# Patient Record
Sex: FEMALE | Race: WHITE | HISPANIC OR LATINO | Employment: UNEMPLOYED | URBAN - METROPOLITAN AREA
[De-identification: names, ages, dates, MRNs, and addresses within clinical notes are randomized per-mention and may not be internally consistent; named-entity substitution may affect disease eponyms.]

---

## 2017-01-17 ENCOUNTER — HOSPITAL ENCOUNTER (OUTPATIENT)
Dept: RADIOLOGY | Facility: MEDICAL CENTER | Age: 42
End: 2017-01-17
Attending: OBSTETRICS & GYNECOLOGY
Payer: COMMERCIAL

## 2017-01-17 DIAGNOSIS — Z12.31 ENCOUNTER FOR SCREENING MAMMOGRAM FOR MALIGNANT NEOPLASM OF BREAST: ICD-10-CM

## 2017-01-17 PROCEDURE — G0202 SCR MAMMO BI INCL CAD: HCPCS

## 2017-09-26 ENCOUNTER — HOSPITAL ENCOUNTER (OUTPATIENT)
Dept: RADIOLOGY | Facility: MEDICAL CENTER | Age: 42
End: 2017-09-26
Attending: FAMILY MEDICINE
Payer: COMMERCIAL

## 2017-09-26 DIAGNOSIS — R76.11 PPD POSITIVE: ICD-10-CM

## 2017-09-26 PROCEDURE — 71010 DX-CHEST-LIMITED (1 VIEW): CPT

## 2017-10-19 ENCOUNTER — SLEEP CENTER VISIT (OUTPATIENT)
Dept: SLEEP MEDICINE | Facility: MEDICAL CENTER | Age: 42
End: 2017-10-19
Payer: COMMERCIAL

## 2017-10-19 VITALS
TEMPERATURE: 98.7 F | SYSTOLIC BLOOD PRESSURE: 120 MMHG | DIASTOLIC BLOOD PRESSURE: 60 MMHG | HEIGHT: 62 IN | WEIGHT: 141 LBS | BODY MASS INDEX: 25.95 KG/M2 | RESPIRATION RATE: 16 BRPM | OXYGEN SATURATION: 96 % | HEART RATE: 73 BPM

## 2017-10-19 DIAGNOSIS — G47.33 OBSTRUCTIVE SLEEP APNEA: ICD-10-CM

## 2017-10-19 PROCEDURE — 99244 OFF/OP CNSLTJ NEW/EST MOD 40: CPT | Performed by: INTERNAL MEDICINE

## 2017-10-19 RX ORDER — LEVOTHYROXINE SODIUM 75 UG/1
75 CAPSULE ORAL DAILY
COMMUNITY

## 2017-10-19 RX ORDER — ZOLPIDEM TARTRATE 5 MG/1
TABLET ORAL
Qty: 3 TAB | Refills: 0 | Status: SHIPPED | OUTPATIENT
Start: 2017-10-19 | End: 2019-04-27

## 2017-10-19 NOTE — PROGRESS NOTES
CC: Sleep apnea evaluation    HPI:    42-year-old female kindly referred by  for evaluation of probable obstructive sleep apnea syndrome. The patient had an 2017 overnight oximetry which showed that she had clustered and saw-toothed desaturations compatible sleep apnea. Her saturations were less than or equal to 89% for 22.8 minutes and her low saturation was 73%.    Has Hashimoto's so unclear to her how much of her sx are 2/2 to that versus ROXANNE.  has noticed that her snoring is getting worse, especially the last 2 years.  Mother might have ROXANNE but hasn't had the evaluation yet, is a snorer.    Her Symptoms:  Snoring: +  Very loud snoring: +  Witnessed apneas: +  Resuscitative snorts: once  Nocturnal shortness of breath: + rarely  Non-restorative sleep: +  Insomnia: -  Nocturnal awakenings: sometimes  Nocturia: -  EDS: + variable  Fatigue: +  Tiredness: +  Falls asleep accidentally: _  Napping or returning to bed after arising: -  Restless legs: -  Limb movements during sleep: -  Nocturnal headaches: -      There are no active problems to display for this patient.      Past Medical History:   Diagnosis Date   • Chickenpox    • Hypothyroidism         Past Surgical History:   Procedure Laterality Date   • REPEAT C SECTION  2011    Performed by ALENA HAWLEY at LABOR AND DELIVERY   • UMBILICAL HERNIA REPAIR  10/24/08    Performed by MICHELLE NIEVES at SURGERY SAME DAY [facility] ORS   • PB ENLARGE BREAST WITH IMPLANT     • PB  DELIVERY ONLY  207       Family History   Problem Relation Age of Onset   • Sleep Apnea Neg Hx        Social History     Social History   • Marital status:      Spouse name: N/A   • Number of children: N/A   • Years of education: N/A     Occupational History   • Not on file.     Social History Main Topics   • Smoking status: Passive Smoke Exposure - Never Smoker   • Smokeless tobacco: Never Used   • Alcohol use No      Comment: occasionally,  "not while pregnant   • Drug use: No   • Sexual activity: Not on file     Other Topics Concern   • Not on file     Social History Narrative   • No narrative on file       Current Outpatient Prescriptions   Medication Sig Dispense Refill   • zolpidem (AMBIEN) 5 MG Tab Take 1-2 tablets by mouth every evening as needed for insomnia. Bring to sleep study. 3 Tab 0   • Levothyroxine Sodium 75 MCG Cap Take  by mouth.       No current facility-administered medications for this visit.     \"CURRENT RX\"    ALLERGIES: Aspirin and Ibuprofen    ROS    Constitutional: Denies fever, chills, sweats, fatigue, weight loss.   Eyes: Denies glasses, vision loss, pain, drainage, double vision.   Ears/Nose/Mouth/Throat: Denies earache, difficulty hearing, ringing/buzzing in ears, rhinitis/nasal congestion, injury, recurrent sore throat, persistent hoarseness, decayed teeth/toothaches.   Cardiovascular: Denies chest pain, tightness, palpitations, swelling in legs/feet, fainting, difficulty breathing when lying down but gets better when sitting up.   Respiratory: Denies shortness of breath, cough, sputum, wheezing, painful breathing, coughing up blood.   Sleep: Per history of present illness  Gastrointestinal: Denies heartburn, difficulty swallowing, nausea, abdominal pain, diarrhea, constipation.   Genitourinary: Denies frequent urination, painful urination, blood in urine, discharge.   Musculoskeletal: Denies painful joints, sore muscles, back pain.   Integumentary: Denies rashes, lumps, color changes.   Neurological: Denies frequent headaches, dizziness, weakness    PHYSICAL EXAM    /60   Pulse 73   Temp 37.1 °C (98.7 °F)   Resp 16   Ht 1.575 m (5' 2\")   Wt 64 kg (141 lb)   SpO2 96%   BMI 25.79 kg/m²   Appearance: Well-nourished, well-developed, no acute distress  Eyes:  PERRLA, EOMI  Hearing:  Grossly intact  Nose:  Normal, no lesions or deformities, turbinates moist  Oropharynx:  Tongue normal, posterior pharynx without " erythema or exudate  Mallampati classification:  1  Neck: Supple, trachea midline, no masses  Respiratory effort:  No intercostal retractions or use of accessory muscles  Lung auscultation:  No wheezes rhonchi rubs or rales  Cardiac auscultation:  No murmurs, rubs, or gallops, no regular rhythm, normal rate  Abdomen:  No tenderness, no organomegaly  Extremities:  No cyanosis, clubbing, edema  Gait and Station:  Normal  Digits and nails: No clubbing, cyanosis, petechiae, or nodes  Musculoskeletal:  Grossly normal  Skin:  No rashes  Orientation:  Oriented time, place, and person  Mood and affect:  No depression, anxiety, agitation  Judgment:  Intact    PROBLEMS:    1. Obstructive sleep apnea  - zolpidem (AMBIEN) 5 MG Tab; Take 1-2 tablets by mouth every evening as needed for insomnia. Bring to sleep study.  Dispense: 3 Tab; Refill: 0  - POLYSOMNOGRAPHY, 4 OR MORE; Future      PLAN:   The patient has signs and symptoms consistent with obstructive sleep apnea hypopnea syndrome. Will schedule her to have a nocturnal polysomnogram using zolpidem to assist with sleep onset and maintenance should the need arise. Will return after the results are available to determine further diagnostic needs and/or treatment options.   The risks of untreated sleep apnea were discussed with the patient at length. Patients with ROXANNE are at increased risk of cardiovascular disease including coronary artery disease, systemic arterial hypertension, pulmonary arterial hypertension, cardiac arrythmias, and stroke. ROXANNE patients have an increased risk of motor vehicle accidents, type 2 diabetes, chronic kidney disease, and non-alcoholic liver disease. The patient was advised to avoid driving a motor vehicle when drowsy.    Positive airway pressure, such as CPAP, is considered first-line and preferred therapy for sleep apnea and may reverse both symptoms and risks.     Return for after sleep study.

## 2018-01-12 ENCOUNTER — SLEEP STUDY (OUTPATIENT)
Dept: SLEEP MEDICINE | Facility: MEDICAL CENTER | Age: 43
End: 2018-01-12
Attending: INTERNAL MEDICINE
Payer: COMMERCIAL

## 2018-01-12 DIAGNOSIS — G47.33 OBSTRUCTIVE SLEEP APNEA: ICD-10-CM

## 2018-01-12 PROCEDURE — 95810 POLYSOM 6/> YRS 4/> PARAM: CPT | Performed by: INTERNAL MEDICINE

## 2018-01-16 ENCOUNTER — SLEEP CENTER VISIT (OUTPATIENT)
Dept: SLEEP MEDICINE | Facility: MEDICAL CENTER | Age: 43
End: 2018-01-16
Payer: COMMERCIAL

## 2018-01-16 VITALS
HEIGHT: 62 IN | HEART RATE: 67 BPM | OXYGEN SATURATION: 97 % | SYSTOLIC BLOOD PRESSURE: 100 MMHG | WEIGHT: 145.5 LBS | BODY MASS INDEX: 26.78 KG/M2 | RESPIRATION RATE: 16 BRPM | DIASTOLIC BLOOD PRESSURE: 64 MMHG

## 2018-01-16 DIAGNOSIS — G47.34 NOCTURNAL HYPOXEMIA: ICD-10-CM

## 2018-01-16 DIAGNOSIS — R06.83 SNORING: ICD-10-CM

## 2018-01-16 PROCEDURE — 99213 OFFICE O/P EST LOW 20 MIN: CPT | Performed by: NURSE PRACTITIONER

## 2018-01-16 NOTE — LETTER
REGINA Escalona  [facility]   [address] [place] [address]  Phone: [phone] - Fax: [phone]           Encounter Date: 2018  Provider: REGINA Escalona  Location of Care: [facility]      Patient:   Maricruz Glass   MR Number: [id number]   YOB: 1975     PROGRESS NOTE:  Chief Complaint   Patient presents with   • Apnea     Sleep Study Results done on 18       HPI:  Maricruz Glass is a 42 y.o. year old female here today for follow-up on her Polysomnogram. She was seen as a new sleep patient 10/19/2017 with Dr. Alexandro Trevizo to evaluate for obstructive sleep apnea. She has a history of nocturnal snoring. She had an overnight oximetry which indicated mild 02 desaturations with a saw tooth pattern. She underwent an in lab Polysomnogram 2017 which indicates no significant sleep apnea with an AHI of 2.8. Low 02 saturation of 82%. However, she only spent 0.9% of the night with saturations less than 90%.   She denies insomnia. She did have an episode of waking up gasping for air. She notes occasional non restorative sleep. She denies morning headaches. She denies sinus congestion or drainage.       Past Medical History:   Diagnosis Date   • Chickenpox    • Hypothyroidism        Past Surgical History:   Procedure Laterality Date   • REPEAT C SECTION  2011    Performed by ALENA HAWLEY at LABOR AND DELIVERY   • UMBILICAL HERNIA REPAIR  10/24/08    Performed by MICHELLE NIEVES at SURGERY SAME DAY [facility] ORS   • PB ENLARGE BREAST WITH IMPLANT     • PB  DELIVERY ONLY  207       Family History   Problem Relation Age of Onset   • Sleep Apnea Neg Hx        Social History     Social History   • Marital status:      Spouse name: N/A   • Number of children: N/A   • Years of education: N/A     Occupational History   • Not on file.     Social History Main Topics   •  "Smoking status: Passive Smoke Exposure - Never Smoker   • Smokeless tobacco: Never Used   • Alcohol use 1.2 oz/week     2 Glasses of wine per week      Comment: 2 PER WEEK   • Drug use: No   • Sexual activity: Not on file     Other Topics Concern   • Not on file     Social History Narrative   • No narrative on file         ROS:  Constitutional: Denies fevers, chills, sweats, weight loss  Eyes: Denies glasses, vision loss, pain, drainage, double vision  Ears/Nose/Mouth/Throat: Denies rhinitis, nasal congestion, ear ache, difficulty hearing, sore throat, persistent hoarseness, decayed teeth/toothache  Cardiovascular: Denies chest pain, tightness, palpitations, swelling in feet/legs, fainting, difficulty breathing when laying down  Respiratory: Denies shortness of breath, cough, sputum, wheezing, painful breathing, coughing up blood  GI: Denies heartburn, difficulty swallowing, nausea, vomiting, abdominal pain, diarrhea, constipation  : Denies frequent urination, painful urination  Integumentary: Denies rashes, lumps or color changes  MSK: Denies painful joints, sore muscles, and back pain.   Neurological: Denies frequent headaches, dizziness, weakness  Sleep: See HPI       Current Outpatient Prescriptions   Medication Sig Dispense Refill   • zolpidem (AMBIEN) 5 MG Tab Take 1-2 tablets by mouth every evening as needed for insomnia. Bring to sleep study. 3 Tab 0   • Levothyroxine Sodium 75 MCG Cap Take  by mouth.       No current facility-administered medications for this visit.        Allergies   Allergen Reactions   • Aspirin Swelling   • Ibuprofen Swelling       Blood pressure 100/64, pulse 67, resp. rate 16, height 1.575 m (5' 2\"), weight 66 kg (145 lb 8 oz), SpO2 97 %.    PE:   Appearance: Well developed, well nourished, no acute distress  Eyes: PERRL, EOM intact, sclera white, conjunctiva moist  Ears: no lesions or deformities  Hearing: grossly intact  Nose: no lesions or deformities  Oropharynx: tongue normal, " posterior pharynx without erythema or exudate  Mallampati Classification: Class 2   Neck: supple, trachea midline, no masses   Respiratory effort: no intercostal retractions or use of accessory muscles  Lung auscultation: no rales, rhonchi or wheezes  Heart auscultation: no murmur rub or gallop  Extremities: no cyanosis or edema  Abdomen: soft ,non tender, no masses  Gait and Station: normal  Digits and nails: no clubbing, cyanosis, petechiae or nodes.  Cranial nerves: grossly intact  Skin: no rashes, lesions or ulcers noted  Orientation: Oriented to time, person and place  Mood and affect: mood and affect appropriate, normal interaction with examiner  Judgement: Intact          Assessment:  1. Nocturnal hypoxemia     2. Snoring           Plan:    1) Reviewed sleep study in detail. No evidence of significant sleep apnea or significant oxygen desaturations. We discussed possible referral to ENT for primary snoring. She denies at this time. Consider breath right strips or use of nasal steroid at bedtime. She did have reduced sleep efficiency. However, this may have been due to laboratory effect. She denies insomnia.  2) PRN follow up.       Electronically signed by JOHANNA EscalonaPJAMI  on 01/16/18    Julian Salgado M.D.  [address] [facility] [id number]  [address]  [place] [address]  VIA Facsimile: [phone]

## 2018-01-16 NOTE — PROCEDURES
Comments:  The patient underwent a diagnostic polysomnogram using the standard montage for measurement of parameters of sleep, respiratory events, movement abnormalities, and heart rate and rhythm.    A microphone was used to monitor snoring.    Analysis:  Study start time was 10:05:02 PM.  Diagnostic recording time was 7h 31.0m with a total sleep time of 4h 36.5m resulting in a sleep efficiency of 61.31%%.    Sleep latency from the start of the study was 20 minutes and the latency from sleep to REM was 232 minutes.  In total,35  arousals were scored for an arousal index of 7.6.    Respiratory:  There were a total of 1 apneas consisting of 0 obstructive apneas, 0 mixed apneas, and 1 central apneas.  A total of 12 hypopneas were scored.  The apnea index was 0.22 per hour and the hypopnea index was 2.60 per hour resulting in an overall AHI of 2.82.  AHI during rem was 0.87 and AHI while supine was 3.37.    Oximetry:  There was a mean oxygen saturation of 93.0% with a minimum oxygen saturation of 82.0%.  Time spent with oxygen saturations below 89% was 2.1 minutes.    Cardiac:  The highest heart rate seen while awake was 85 BPM while the highest heart rate during sleep was 78 BPM with an average sleeping heart rate of 63 BPM.    Limb Movements:  There were a total of 0 PLMs during sleep, of which 0 were PLMS arousals.  This resulted in a PLMS index of 0.0 and a PLMS arousal index of 0.0.      Interpretation:    Ms. Glass presented with loud snoring, witnessed nocturnal apnea and excessive daytime somnolence. This is a diagnostic study.    There is rather severe fragmentation of sleep with increased wake time totaling almost 3 hours and a moderate elevation in the arousal and awakening index. About 15 minutes of REM sleep time is included. There are some periodic limb movements but they do not significantly affect sleep continuity.    The apnea hypopnea index is 2.8 events per hour, consisting exclusively of hypopnea  episodes with a single central apnea. All of the study was done in the supine body position. The lowest arterial oxygen saturation was 84% on room air with some cyclic drops in saturation associated with the hypopnea episodes. In reviewing the raw data of airflow tracing there do seem to be some flow-limiting abnormalities associated with mild arterial oxygen desaturation.    Assessment:  Significant fragmentation of sleep. The apnea hypopnea index of 2.8 events per hours is within the normal range but the occasional episodes of hypopnea are associated with arterial oxygen desaturation to a minimum of 84%. Fragmentation of sleep associated with airflow-limiting abnormalities raises the possibility of upper resistance syndrome.    Recommendations:  This study will he rescored to look for evidence of airflow limiting abnormalities that do not meet criteria for hypopnea in an effort to identify upper airway resistance syndrome. If the clinical probability of obstructive sleep apnea hypopnea syndrome remains high, repeat polysomnography might be considered.

## 2018-01-16 NOTE — PATIENT INSTRUCTIONS
Plan:    1) Reviewed sleep study in detail. No evidence of significant sleep apnea or significant oxygen desaturations. We discussed possible referral to ENT for primary snoring. She denies at this time. Consider breath right strips or use of nasal steroid at bedtime. She did have reduced sleep efficiency. However, this may have been due to laboratory effect. She denies insomnia.  2) PRN follow up.

## 2018-01-16 NOTE — PROGRESS NOTES
Chief Complaint   Patient presents with   • Apnea     Sleep Study Results done on 18       HPI:  Maricruz Glass is a 42 y.o. year old female here today for follow-up on her Polysomnogram. She was seen as a new sleep patient 10/19/2017 with Dr. Alexandro Trevizo to evaluate for obstructive sleep apnea. She has a history of nocturnal snoring. She had an overnight oximetry which indicated mild 02 desaturations with a saw tooth pattern. She underwent an in lab Polysomnogram 2017 which indicates no significant sleep apnea with an AHI of 2.8. Low 02 saturation of 82%. However, she only spent 0.9% of the night with saturations less than 90%.   She denies insomnia. She did have an episode of waking up gasping for air. She notes occasional non restorative sleep. She denies morning headaches. She denies sinus congestion or drainage.       Past Medical History:   Diagnosis Date   • Chickenpox    • Hypothyroidism        Past Surgical History:   Procedure Laterality Date   • REPEAT C SECTION  2011    Performed by ALENA HAWLEY at LABOR AND DELIVERY   • UMBILICAL HERNIA REPAIR  10/24/08    Performed by MICHELLE NIEVES at SURGERY SAME DAY [facility] ORS   • PB ENLARGE BREAST WITH IMPLANT     • PB  DELIVERY ONLY  207       Family History   Problem Relation Age of Onset   • Sleep Apnea Neg Hx        Social History     Social History   • Marital status:      Spouse name: N/A   • Number of children: N/A   • Years of education: N/A     Occupational History   • Not on file.     Social History Main Topics   • Smoking status: Passive Smoke Exposure - Never Smoker   • Smokeless tobacco: Never Used   • Alcohol use 1.2 oz/week     2 Glasses of wine per week      Comment: 2 PER WEEK   • Drug use: No   • Sexual activity: Not on file     Other Topics Concern   • Not on file     Social History Narrative   • No narrative on file         ROS:  Constitutional: Denies fevers, chills, sweats, weight loss  Eyes: Denies  "glasses, vision loss, pain, drainage, double vision  Ears/Nose/Mouth/Throat: Denies rhinitis, nasal congestion, ear ache, difficulty hearing, sore throat, persistent hoarseness, decayed teeth/toothache  Cardiovascular: Denies chest pain, tightness, palpitations, swelling in feet/legs, fainting, difficulty breathing when laying down  Respiratory: Denies shortness of breath, cough, sputum, wheezing, painful breathing, coughing up blood  GI: Denies heartburn, difficulty swallowing, nausea, vomiting, abdominal pain, diarrhea, constipation  : Denies frequent urination, painful urination  Integumentary: Denies rashes, lumps or color changes  MSK: Denies painful joints, sore muscles, and back pain.   Neurological: Denies frequent headaches, dizziness, weakness  Sleep: See HPI       Current Outpatient Prescriptions   Medication Sig Dispense Refill   • zolpidem (AMBIEN) 5 MG Tab Take 1-2 tablets by mouth every evening as needed for insomnia. Bring to sleep study. 3 Tab 0   • Levothyroxine Sodium 75 MCG Cap Take  by mouth.       No current facility-administered medications for this visit.        Allergies   Allergen Reactions   • Aspirin Swelling   • Ibuprofen Swelling       Blood pressure 100/64, pulse 67, resp. rate 16, height 1.575 m (5' 2\"), weight 66 kg (145 lb 8 oz), SpO2 97 %.    PE:   Appearance: Well developed, well nourished, no acute distress  Eyes: PERRL, EOM intact, sclera white, conjunctiva moist  Ears: no lesions or deformities  Hearing: grossly intact  Nose: no lesions or deformities  Oropharynx: tongue normal, posterior pharynx without erythema or exudate  Mallampati Classification: Class 2   Neck: supple, trachea midline, no masses   Respiratory effort: no intercostal retractions or use of accessory muscles  Lung auscultation: no rales, rhonchi or wheezes  Heart auscultation: no murmur rub or gallop  Extremities: no cyanosis or edema  Abdomen: soft ,non tender, no masses  Gait and Station: normal  Digits " and nails: no clubbing, cyanosis, petechiae or nodes.  Cranial nerves: grossly intact  Skin: no rashes, lesions or ulcers noted  Orientation: Oriented to time, person and place  Mood and affect: mood and affect appropriate, normal interaction with examiner  Judgement: Intact          Assessment:  1. Nocturnal hypoxemia     2. Snoring           Plan:    1) Reviewed sleep study in detail. No evidence of significant sleep apnea or significant oxygen desaturations. We discussed possible referral to ENT for primary snoring. She denies at this time. Consider breath right strips or use of nasal steroid at bedtime. She did have reduced sleep efficiency. However, this may have been due to laboratory effect. She denies insomnia.  2) PRN follow up.

## 2018-03-14 ENCOUNTER — OFFICE VISIT (OUTPATIENT)
Dept: URGENT CARE | Facility: CLINIC | Age: 43
End: 2018-03-14
Payer: COMMERCIAL

## 2018-03-14 VITALS
TEMPERATURE: 99.2 F | DIASTOLIC BLOOD PRESSURE: 68 MMHG | BODY MASS INDEX: 24.98 KG/M2 | SYSTOLIC BLOOD PRESSURE: 114 MMHG | WEIGHT: 141 LBS | RESPIRATION RATE: 14 BRPM | OXYGEN SATURATION: 96 % | HEART RATE: 85 BPM | HEIGHT: 63 IN

## 2018-03-14 DIAGNOSIS — J10.1 INFLUENZA B: ICD-10-CM

## 2018-03-14 DIAGNOSIS — J02.9 SORE THROAT: ICD-10-CM

## 2018-03-14 DIAGNOSIS — R68.89 FLU-LIKE SYMPTOMS: ICD-10-CM

## 2018-03-14 LAB
INT CON NEG: NORMAL
INT CON POS: NORMAL
S PYO AG THROAT QL: NEGATIVE

## 2018-03-14 PROCEDURE — 99204 OFFICE O/P NEW MOD 45 MIN: CPT | Performed by: NURSE PRACTITIONER

## 2018-03-14 PROCEDURE — 87880 STREP A ASSAY W/OPTIC: CPT | Performed by: NURSE PRACTITIONER

## 2018-03-14 RX ORDER — OSELTAMIVIR PHOSPHATE 75 MG/1
75 CAPSULE ORAL 2 TIMES DAILY
Qty: 10 CAP | Refills: 0 | Status: SHIPPED | OUTPATIENT
Start: 2018-03-14 | End: 2019-04-27

## 2018-03-14 ASSESSMENT — ENCOUNTER SYMPTOMS
MYALGIAS: 1
COUGH: 1
SORE THROAT: 1
FEVER: 1
BODY ACHES: 1
CHILLS: 1

## 2018-03-14 ASSESSMENT — PATIENT HEALTH QUESTIONNAIRE - PHQ9: CLINICAL INTERPRETATION OF PHQ2 SCORE: 0

## 2018-03-14 NOTE — PROGRESS NOTES
Subjective:      Maricruz Glass is a 42 y.o. female who presents with Cough; Pharyngitis; and Generalized Body Aches            Cough   This is a new problem. Episode onset: Pt presents with 2 days of fever, ST, cough and body aches. Reports her temp at home was 101F. The problem has been unchanged. The cough is non-productive. Associated symptoms include chills, a fever, myalgias and a sore throat. She has tried rest (acetaminophen) for the symptoms. The treatment provided mild relief. There is no history of asthma or pneumonia.   Pharyngitis    Associated symptoms include congestion and coughing.   Generalized Body Aches   Associated symptoms include chills, congestion, coughing, a fever, myalgias and a sore throat.       Review of Systems   Constitutional: Positive for chills and fever.   HENT: Positive for congestion and sore throat.    Respiratory: Positive for cough.    Musculoskeletal: Positive for myalgias.   All other systems reviewed and are negative.    Past Medical History:   Diagnosis Date   • Chickenpox    • Hypothyroidism       Past Surgical History:   Procedure Laterality Date   • REPEAT C SECTION  2011    Performed by ALENA HAWLEY at LABOR AND DELIVERY   • UMBILICAL HERNIA REPAIR  10/24/08    [facility]   • PB ENLARGE BREAST WITH IMPLANT     • PB  DELIVERY ONLY  207      Social History     Social History   • Marital status:      Spouse name: N/A   • Number of children: N/A   • Years of education: N/A     Occupational History   • Not on file.     Social History Main Topics   • Smoking status: Passive Smoke Exposure - Never Smoker   • Smokeless tobacco: Never Used   • Alcohol use 1.2 oz/week     2 Glasses of wine per week      Comment: 2 PER WEEK   • Drug use: No   • Sexual activity: Not on file     Other Topics Concern   • Not on file     Social History Narrative   • No narrative on file          Objective:     /68   " Pulse 85   Temp 37.3 °C (99.2 °F)   Resp 14   Ht 1.6 m (5' 3\")   Wt 64 kg (141 lb)   SpO2 96%   BMI 24.98 kg/m²      Physical Exam   Constitutional: She is oriented to person, place, and time. Vital signs are normal. She appears well-developed and well-nourished. She appears ill.   HENT:   Head: Normocephalic and atraumatic.   Right Ear: Tympanic membrane and external ear normal.   Left Ear: Tympanic membrane and external ear normal.   Nose: Rhinorrhea present.   Mouth/Throat: Oropharynx is clear and moist.   Eyes: EOM are normal. Pupils are equal, round, and reactive to light.   Neck: Normal range of motion.   Cardiovascular: Normal rate and regular rhythm.    Pulmonary/Chest: Effort normal and breath sounds normal.   Musculoskeletal: Normal range of motion.   Lymphadenopathy:        Head (right side): Submandibular adenopathy present.        Head (left side): Submandibular adenopathy present.   Neurological: She is alert and oriented to person, place, and time.   Skin: Skin is warm and dry. Capillary refill takes less than 2 seconds.   Psychiatric: She has a normal mood and affect. Her speech is normal and behavior is normal. Thought content normal.   Vitals reviewed.              Assessment/Plan:     1. Flu-like symptoms  - POCT Influenza A/B POSITIVE FLU B    2. Sore throat  - POCT Rapid Strep A NEGATIVE    3. Influenza B  - oseltamivir (TAMIFLU) 75 MG Cap; Take 1 Cap by mouth 2 times a day.  Dispense: 10 Cap; Refill: 0    Increase water intake  Tylenol and ibuprofen PRN pain/fever  Plenty of rest  OTC meds to help with s/s relief  Work note provided  Supportive care, differential diagnoses, and indications for immediate follow-up discussed with patient.    Pathogenesis of diagnosis discussed including typical length and natural progression.      Instructed to return to  or nearest emergency department if symptoms fail to improve, for any change in condition, further concerns, or new concerning " symptoms.  Patient states understanding of the plan of care and discharge instructions.

## 2018-03-14 NOTE — LETTER
March 14, 2018         Patient: Maricruz Glass   YOB: 1975   Date of Visit: 3/14/2018           To Whom it May Concern:    Maricruz Glass was seen in my clinic on 3/14/2018. Please excuse her from work 3/16/18.      Sincerely,           TOLU Beavers.  Electronically Signed

## 2019-01-01 ENCOUNTER — HOSPITAL ENCOUNTER (OUTPATIENT)
Dept: RADIOLOGY | Facility: MEDICAL CENTER | Age: 44
End: 2019-01-01
Attending: FAMILY MEDICINE
Payer: COMMERCIAL

## 2019-01-01 DIAGNOSIS — R79.89 ELEVATED LFTS: ICD-10-CM

## 2019-01-01 DIAGNOSIS — R10.11 RUQ PAIN: ICD-10-CM

## 2019-01-01 PROCEDURE — 76705 ECHO EXAM OF ABDOMEN: CPT

## 2019-04-27 ENCOUNTER — APPOINTMENT (OUTPATIENT)
Dept: RADIOLOGY | Facility: IMAGING CENTER | Age: 44
End: 2019-04-27
Attending: NURSE PRACTITIONER
Payer: COMMERCIAL

## 2019-04-27 ENCOUNTER — OFFICE VISIT (OUTPATIENT)
Dept: URGENT CARE | Facility: CLINIC | Age: 44
End: 2019-04-27
Payer: COMMERCIAL

## 2019-04-27 VITALS
HEIGHT: 62 IN | TEMPERATURE: 99.1 F | DIASTOLIC BLOOD PRESSURE: 72 MMHG | OXYGEN SATURATION: 96 % | RESPIRATION RATE: 14 BRPM | SYSTOLIC BLOOD PRESSURE: 106 MMHG | BODY MASS INDEX: 26.31 KG/M2 | HEART RATE: 74 BPM | WEIGHT: 143 LBS

## 2019-04-27 DIAGNOSIS — S69.92XA INJURY OF LEFT WRIST, INITIAL ENCOUNTER: ICD-10-CM
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
## 2019-04-27 DIAGNOSIS — S60.512A ABRASION OF LEFT HAND, INITIAL ENCOUNTER: ICD-10-CM

## 2019-04-27 PROCEDURE — 99214 OFFICE O/P EST MOD 30 MIN: CPT | Performed by: NURSE PRACTITIONER

## 2019-04-27 PROCEDURE — 73110 X-RAY EXAM OF WRIST: CPT | Mod: TC,FY,LT | Performed by: NURSE PRACTITIONER

## 2019-04-27 RX ORDER — ACETAMINOPHEN 500 MG
500 TABLET ORAL ONCE
Status: COMPLETED | OUTPATIENT
Start: 2019-04-27 | End: 2019-04-27

## 2019-04-27 RX ADMIN — Medication 500 MG: at 16:43

## 2019-04-27 ASSESSMENT — ENCOUNTER SYMPTOMS
MUSCLE WEAKNESS: 0
FALLS: 1
DIARRHEA: 0
DIZZINESS: 0
FEVER: 0
CONSTIPATION: 0
BACK PAIN: 0
MYALGIAS: 0
SHORTNESS OF BREATH: 0
WHEEZING: 0
NUMBNESS: 1
ABDOMINAL PAIN: 0
NAUSEA: 0
HEADACHES: 0
WEAKNESS: 0
TINGLING: 0
DIAPHORESIS: 0
VOMITING: 0
COUGH: 0
PALPITATIONS: 0
CHILLS: 0

## 2019-04-27 NOTE — PROGRESS NOTES
Subjective:   Maricruz Glass is a 43 y.o. female who presents for Wrist Injury (Left wrist injury, at soccer game, walking down dirt trail, slipped and fell on buttock and left hand/wrist took impact. Tx: Ice.)        Wrist Injury    The incident occurred less than 1 hour ago. The incident occurred at the park (Walking down hill outside to soccer field and accidentally fell backwards, landing on left wrist.). The injury mechanism was a fall (Denies LOC or hitting head). The pain is present in the left wrist. The quality of the pain is described as aching. The pain radiates to the left arm. The pain is at a severity of 5/10. The pain is moderate. The pain has been fluctuating since the incident. Associated symptoms include numbness. Pertinent negatives include no chest pain, muscle weakness or tingling. Associated symptoms comments: Numbness to back of left forearm. . The symptoms are aggravated by movement. She has tried ice for the symptoms. The treatment provided mild relief.      Accompanied by  in office.    Review of Systems   Constitutional: Negative for chills, diaphoresis and fever.   Respiratory: Negative for cough, shortness of breath and wheezing.    Cardiovascular: Negative for chest pain and palpitations.   Gastrointestinal: Negative for abdominal pain, constipation, diarrhea, nausea and vomiting.   Musculoskeletal: Positive for falls and joint pain. Negative for back pain and myalgias.        Left wrist pain   Skin: Negative for itching and rash.   Neurological: Positive for numbness. Negative for dizziness, tingling, weakness and headaches.   All other systems reviewed and are negative.    PMH:  has a past medical history of Chickenpox and Hypothyroidism.  MEDS:   Current Outpatient Prescriptions:   •  Levothyroxine Sodium 75 MCG Cap, Take 75 mcg by mouth every day., Disp: , Rfl:   ALLERGIES:   Allergies   Allergen Reactions   • Aspirin Swelling   • Ibuprofen Swelling     SURGHX:   Past Surgical  "History:   Procedure Laterality Date   • REPEAT C SECTION  2011    Performed by ALENA HAWLEY at LABOR AND DELIVERY   • UMBILICAL HERNIA REPAIR  10/24/08    Performed by MICHELLE NIEVES at SURGERY SAME DAY [facility]   • PB ENLARGE BREAST WITH IMPLANT     • PB  DELIVERY ONLY  207     SOCHX:  reports that she is a non-smoker but has been exposed to tobacco smoke. She has never used smokeless tobacco. She reports that she drinks about 1.2 oz of alcohol per week . She reports that she does not use drugs.  FH: Family history was reviewed, no pertinent findings to report     Objective:   /72 (BP Location: Right arm, Patient Position: Sitting, BP Cuff Size: Adult)   Pulse 74   Temp 37.3 °C (99.1 °F) (Temporal)   Resp 14   Ht 1.575 m (5' 2\")   Wt 64.9 kg (143 lb)   SpO2 96%   BMI 26.16 kg/m²   Physical Exam   Constitutional: She appears well-developed and well-nourished. No distress.   HENT:   Head: Normocephalic.   Right Ear: Hearing normal.   Left Ear: Hearing normal.   Nose: Nose normal.   Eyes: Pupils are equal, round, and reactive to light. Conjunctivae, EOM and lids are normal.   Neck: Normal range of motion.   Cardiovascular: Normal rate, regular rhythm and normal heart sounds.    Pulmonary/Chest: Effort normal and breath sounds normal. No respiratory distress. She has no decreased breath sounds. She has no wheezes.   Musculoskeletal:        Left wrist: She exhibits decreased range of motion, tenderness and swelling.        Arms:  Minimal left hand  - pain when attempting to    Neurological: She is alert.   Skin: Skin is warm. Abrasion noted. No rash noted. She is not diaphoretic.   Minor superficial abrasions to palm of left hand. No active bleeding   Psychiatric: She has a normal mood and affect. Her speech is normal and behavior is normal. Judgment and thought content normal.   Vitals reviewed.        Assessment/Plan:   Assessment    1. Injury of left wrist, initial " encounter  - DX-WRIST-COMPLETE 3+ LEFT; Future  - acetaminophen (TYLENOL) tablet 500 mg; Take 1 Tab by mouth Once.    2. Fall, initial encounter  - acetaminophen (TYLENOL) tablet 500 mg; Take 1 Tab by mouth Once.    3. Abrasion of left hand, initial encounter    Xray negative  Patient given left wrist brace in office.  Rest and elevate the affected area with pillows.  You may apply ice packs to the affected area up to 4 times daily for 30 minutes at a time  You may apply compression to the affected area by wrapping with an ace bandage  May take OTC Tylenol as needed for pain; as directed on package  Discussed to apply Neosporin to areas of abrasion    Differential diagnosis, natural history, supportive care, and indications for immediate follow-up discussed.

## 2019-12-07 ENCOUNTER — HOSPITAL ENCOUNTER (OUTPATIENT)
Dept: RADIOLOGY | Facility: MEDICAL CENTER | Age: 44
End: 2019-12-07
Attending: OBSTETRICS & GYNECOLOGY
Payer: COMMERCIAL

## 2019-12-07 DIAGNOSIS — Z12.31 VISIT FOR SCREENING MAMMOGRAM: ICD-10-CM

## 2019-12-07 PROCEDURE — 77063 BREAST TOMOSYNTHESIS BI: CPT

## 2019-12-10 ENCOUNTER — APPOINTMENT (OUTPATIENT)
Dept: RADIOLOGY | Facility: MEDICAL CENTER | Age: 44
End: 2019-12-10
Attending: OBSTETRICS & GYNECOLOGY
Payer: COMMERCIAL

## 2020-02-06 ENCOUNTER — HOSPITAL ENCOUNTER (OUTPATIENT)
Dept: RADIOLOGY | Facility: MEDICAL CENTER | Age: 45
End: 2020-02-06
Attending: FAMILY MEDICINE
Payer: COMMERCIAL

## 2020-02-06 DIAGNOSIS — R92.2 DENSE BREASTS: ICD-10-CM

## 2020-02-06 DIAGNOSIS — R92.30 DENSE BREASTS: ICD-10-CM

## 2020-02-06 PROCEDURE — 76641 ULTRASOUND BREAST COMPLETE: CPT

## 2020-02-23 ENCOUNTER — OFFICE VISIT (OUTPATIENT)
Dept: URGENT CARE | Facility: CLINIC | Age: 45
End: 2020-02-23
Payer: COMMERCIAL

## 2020-02-23 VITALS
HEART RATE: 102 BPM | TEMPERATURE: 99.5 F | DIASTOLIC BLOOD PRESSURE: 68 MMHG | RESPIRATION RATE: 16 BRPM | WEIGHT: 150 LBS | SYSTOLIC BLOOD PRESSURE: 108 MMHG | HEIGHT: 62 IN | OXYGEN SATURATION: 97 % | BODY MASS INDEX: 27.6 KG/M2

## 2020-02-23 DIAGNOSIS — R68.89 FLU-LIKE SYMPTOMS: ICD-10-CM

## 2020-02-23 DIAGNOSIS — K52.9 ACUTE GASTROENTERITIS: ICD-10-CM

## 2020-02-23 LAB
FLUAV+FLUBV AG SPEC QL IA: NEGATIVE
INT CON NEG: NEGATIVE
INT CON POS: POSITIVE

## 2020-02-23 PROCEDURE — 99214 OFFICE O/P EST MOD 30 MIN: CPT | Performed by: FAMILY MEDICINE

## 2020-02-23 PROCEDURE — 87804 INFLUENZA ASSAY W/OPTIC: CPT | Performed by: FAMILY MEDICINE

## 2020-02-23 RX ORDER — ONDANSETRON 4 MG/1
TABLET, ORALLY DISINTEGRATING ORAL
Qty: 15 TAB | Refills: 0 | Status: SHIPPED | OUTPATIENT
Start: 2020-02-23 | End: 2021-05-03

## 2020-02-23 RX ORDER — DICYCLOMINE HCL 20 MG
TABLET ORAL
Qty: 20 TAB | Refills: 0 | Status: SHIPPED | OUTPATIENT
Start: 2020-02-23 | End: 2021-05-03

## 2020-02-24 NOTE — PROGRESS NOTES
Chief Complaint:    Chief Complaint   Patient presents with   • Other     throwing up, chills, fever, fatigue, no appetite started 1 day ago       History of Present Illness:     present. This is a new problem. Last night, she started feeling some upset stomach. She vomited early this AM and again at 8:20 AM and afterwards she felt improved. She does not feel nauseated now, but she has not been eating today due to decreased appetite. She is drinking liquids. After she vomited twice today, she started with fever up to 100.5 F, chills, and body aches. No nasal symptoms, sore throat, cough, or diarrhea. Took Tylenol which helped some.      Review of Systems:    Constitutional: See HPI.   Eyes: Negative for change in vision, photophobia, pain, redness, and discharge.  ENT: Negative for ear pain, ear discharge, hearing loss, tinnitus, nasal congestion, nosebleeds, and sore throat.    Respiratory: Negative for cough, hemoptysis, sputum production, shortness of breath, wheezing, and stridor.    Cardiovascular: Negative for chest pain, palpitations, orthopnea, claudication, leg swelling, and PND.   Gastrointestinal: See HPI.   Genitourinary: Negative for dysuria, urinary urgency, urinary frequency, hematuria, and flank pain.   Musculoskeletal: See HPI.  Skin: Negative for rash and itching.   Neurological: Negative for dizziness, tingling, tremors, sensory change, speech change, focal weakness, seizures, loss of consciousness, and headaches.   Endo: Hypothyroid, on medication.  Heme: Does not bruise/bleed easily.   Psychiatric/Behavioral: Negative for depression, suicidal ideas, hallucinations, memory loss and substance abuse. The patient is not nervous/anxious and does not have insomnia.        Past Medical History:    Past Medical History:   Diagnosis Date   • Chickenpox    • Hypothyroidism      Past Surgical History:    Past Surgical History:   Procedure Laterality Date   • REPEAT C SECTION  2/9/2011    Performed  by ALENA HAWLEY at LABOR AND DELIVERY   • UMBILICAL HERNIA REPAIR  10/24/08    [facility]   • PB ENLARGE BREAST WITH IMPLANT     • PB  DELIVERY ONLY  207     Social History:    Social History     Socioeconomic History   • Marital status:      Spouse name: Not on file   • Number of children: Not on file   • Years of education: Not on file   • Highest education level: Not on file   Occupational History   • Not on file   Social Needs   • Financial resource strain: Not on file   • Food insecurity     Worry: Not on file     Inability: Not on file   • Transportation needs     Medical: Not on file     Non-medical: Not on file   Tobacco Use   • Smoking status: Passive Smoke Exposure - Never Smoker   • Smokeless tobacco: Never Used   Substance and Sexual Activity   • Alcohol use: Yes     Alcohol/week: 1.2 oz     Types: 2 Glasses of wine per week     Comment: 2 PER WEEK   • Drug use: No   • Sexual activity: Not on file   Lifestyle   • Physical activity     Days per week: Not on file     Minutes per session: Not on file   • Stress: Not on file   Relationships   • Social connections     Talks on phone: Not on file     Gets together: Not on file     Attends Holiness service: Not on file     Active member of club or organization: Not on file     Attends meetings of clubs or organizations: Not on file     Relationship status: Not on file   • Intimate partner violence     Fear of current or ex partner: Not on file     Emotionally abused: Not on file     Physically abused: Not on file     Forced sexual activity: Not on file   Other Topics Concern   • Not on file   Social History Narrative   • Not on file     Family History:    Family History   Problem Relation Age of Onset   • Sleep Apnea Neg Hx      Medications:    Current Outpatient Medications on File Prior to Visit   Medication Sig Dispense Refill   • Levothyroxine Sodium 75 MCG Cap Take 75 mcg by  "mouth every day.       No current facility-administered medications on file prior to visit.      Allergies:    Allergies   Allergen Reactions   • Aspirin Swelling   • Ibuprofen Swelling       Vitals:    Vitals:    02/23/20 1637   BP: 108/68   Pulse: (!) 102   Resp: 16   Temp: 37.5 °C (99.5 °F)   SpO2: 97%   Weight: 68 kg (150 lb)   Height: 1.575 m (5' 2\")       Physical Exam:    Constitutional: Vital signs reviewed. Appears well-developed and well-nourished. No acute distress.   Eyes: Sclera white, conjunctivae clear.   ENT: External ears normal. Hearing normal. Nasal mucosa pink. Lips/teeth are normal. Oral mucosa pink and moist. Posterior pharynx: WNL.  Neck: Neck supple.   Cardiovascular: Regular rate and rhythm. No murmur.  Pulmonary/Chest: Respirations non-labored. Clear to auscultation bilaterally.  Abdomen: Mild diffuse tenderness to palpation. Bowel sounds are normal active. Soft, non-distended.  Musculoskeletal: Normal gait. Normal range of motion. No muscular atrophy or weakness.  Neurological: Alert and oriented to person, place, and time. Muscle tone normal. Coordination normal.   Skin: No rashes or lesions. Warm, dry, normal turgor.  Psychiatric: Normal mood and affect. Behavior is normal. Judgment and thought content normal.       Diagnostics:    POCT Influenza A/B   Order: [id number]   Status:  Final result   Visible to patient:  No (Not Released) Next appt:  None Dx:  Flu-like symptoms   Component 4:44 PM   Rapid Influenza A-B Negative    Internal Control Positive Positive    Internal Control Negative Negative          Specimen Collected: 02/23/20  4:44 PM Last Resulted: 02/23/20  5:14 PM             Assessment / Plan:    1. Flu-like symptoms  - POCT Influenza A/B    2. Acute gastroenteritis  - ondansetron (ZOFRAN ODT) 4 MG TABLET DISPERSIBLE; 1 TAB, ALLOW TO DISINTEGRATE IN MOUTH, EVERY 8 HOURS ONLY IF NEEDED FOR NAUSEA OR VOMITING.  Dispense: 15 Tab; Refill: 0  - dicyclomine (BENTYL) 20 MG Tab; 1 " TAB BY MOUTH EVERY 6 HOURS ONLY IF NEEDED FOR DIARRHEA AND/OR ABDOMINAL CRAMPS.  Dispense: 20 Tab; Refill: 0      Discussed with them DDX, management options, and risks, benefits, and alternatives to treatment plan agreed upon.    Recommended treat symptoms with meds prn, o/w further observation and see if symptoms will self-resolve as likely viral etiology at this time.    May use OTC Tylenol/Ibuprofen prn fever and/or body aches.    Agreeable to medications prescribed.    Recommended stay hydrated with small but frequent quantities of p.o. fluids. May eat small quantities of soft, bland foods. Advance diet as tolerated.    Discussed expected course of duration, time for improvement, and to seek follow-up in Emergency Room, urgent care, or with PCP if getting worse at any time or not improving within expected time frame.

## 2021-05-03 ENCOUNTER — OFFICE VISIT (OUTPATIENT)
Dept: URGENT CARE | Facility: CLINIC | Age: 46
End: 2021-05-03
Payer: COMMERCIAL

## 2021-05-03 ENCOUNTER — APPOINTMENT (OUTPATIENT)
Dept: RADIOLOGY | Facility: IMAGING CENTER | Age: 46
End: 2021-05-03
Attending: PHYSICIAN ASSISTANT
Payer: COMMERCIAL

## 2021-05-03 VITALS
HEIGHT: 62 IN | SYSTOLIC BLOOD PRESSURE: 102 MMHG | RESPIRATION RATE: 16 BRPM | BODY MASS INDEX: 27.34 KG/M2 | HEART RATE: 72 BPM | WEIGHT: 148.6 LBS | OXYGEN SATURATION: 97 % | DIASTOLIC BLOOD PRESSURE: 70 MMHG | TEMPERATURE: 98.8 F

## 2021-05-03 DIAGNOSIS — M54.2 CERVICAL PAIN (NECK): ICD-10-CM

## 2021-05-03 DIAGNOSIS — S16.1XXA STRAIN OF NECK MUSCLE, INITIAL ENCOUNTER: ICD-10-CM

## 2021-05-03 PROCEDURE — 72040 X-RAY EXAM NECK SPINE 2-3 VW: CPT | Mod: TC,FY | Performed by: PHYSICIAN ASSISTANT

## 2021-05-03 PROCEDURE — 99214 OFFICE O/P EST MOD 30 MIN: CPT | Performed by: PHYSICIAN ASSISTANT

## 2021-05-03 ASSESSMENT — PAIN SCALES - GENERAL: PAINLEVEL: 5=MODERATE PAIN

## 2021-05-03 ASSESSMENT — ENCOUNTER SYMPTOMS: NECK PAIN: 1
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
## 2021-05-04 ASSESSMENT — ENCOUNTER SYMPTOMS
DIZZINESS: 0
VOMITING: 0
DOUBLE VISION: 0
NAUSEA: 0
EYE DISCHARGE: 0
EYE REDNESS: 0
HEADACHES: 1
BLURRED VISION: 0
PHOTOPHOBIA: 0
LOSS OF CONSCIOUSNESS: 0
BACK PAIN: 1

## 2021-05-04 ASSESSMENT — VISUAL ACUITY: OU: 1

## 2021-05-09 ENCOUNTER — HOSPITAL ENCOUNTER (EMERGENCY)
Facility: MEDICAL CENTER | Age: 46
End: 2021-05-09
Attending: EMERGENCY MEDICINE | Admitting: EMERGENCY MEDICINE
Payer: COMMERCIAL

## 2021-05-09 ENCOUNTER — APPOINTMENT (OUTPATIENT)
Dept: RADIOLOGY | Facility: MEDICAL CENTER | Age: 46
End: 2021-05-09
Attending: EMERGENCY MEDICINE
Payer: COMMERCIAL

## 2021-05-09 VITALS
SYSTOLIC BLOOD PRESSURE: 112 MMHG | TEMPERATURE: 97.7 F | RESPIRATION RATE: 16 BRPM | BODY MASS INDEX: 27.47 KG/M2 | WEIGHT: 149.25 LBS | OXYGEN SATURATION: 99 % | HEART RATE: 70 BPM | HEIGHT: 62 IN | DIASTOLIC BLOOD PRESSURE: 70 MMHG

## 2021-05-09 DIAGNOSIS — N30.00 ACUTE CYSTITIS WITHOUT HEMATURIA: ICD-10-CM

## 2021-05-09 LAB
APPEARANCE UR: CLEAR
BACTERIA #/AREA URNS HPF: ABNORMAL /HPF
BILIRUB UR QL STRIP.AUTO: NEGATIVE
COLOR UR: YELLOW
EPI CELLS #/AREA URNS HPF: ABNORMAL /HPF
GLUCOSE UR STRIP.AUTO-MCNC: NEGATIVE MG/DL
KETONES UR STRIP.AUTO-MCNC: NEGATIVE MG/DL
LEUKOCYTE ESTERASE UR QL STRIP.AUTO: ABNORMAL
MICRO URNS: ABNORMAL
NITRITE UR QL STRIP.AUTO: NEGATIVE
PH UR STRIP.AUTO: 7 [PH] (ref 5–8)
PROT UR QL STRIP: NEGATIVE MG/DL
RBC # URNS HPF: ABNORMAL /HPF
RBC UR QL AUTO: NEGATIVE
SP GR UR STRIP.AUTO: 1.01
WBC #/AREA URNS HPF: ABNORMAL /HPF

## 2021-05-09 PROCEDURE — 87086 URINE CULTURE/COLONY COUNT: CPT

## 2021-05-09 PROCEDURE — 81001 URINALYSIS AUTO W/SCOPE: CPT

## 2021-05-09 PROCEDURE — 99283 EMERGENCY DEPT VISIT LOW MDM: CPT

## 2021-05-09 PROCEDURE — 76700 US EXAM ABDOM COMPLETE: CPT

## 2021-05-09 RX ORDER — CEPHALEXIN 500 MG/1
500 CAPSULE ORAL 4 TIMES DAILY
Qty: 28 CAPSULE | Refills: 0 | Status: SHIPPED | OUTPATIENT
Start: 2021-05-09 | End: 2021-05-16
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
## 2021-05-09 NOTE — ED TRIAGE NOTES
Chief Complaint   Patient presents with   • Urinary Frequency     Since yesterday   Pt states she feels like she has to go to the bathroom constantly. Ua collected and sent to the lab.

## 2021-05-09 NOTE — ED NOTES
Pt received d/c instr; pt verbalized understanding. Pt amb to lobby s/p d/c  
Returned from US, waiting results. Report to Saundra.  
Taken to US.  
Never

## 2021-05-11 LAB
BACTERIA UR CULT: NORMAL
SIGNIFICANT IND 70042: NORMAL
SITE SITE: NORMAL
SOURCE SOURCE: NORMAL

## 2022-04-01 ENCOUNTER — OFFICE VISIT (OUTPATIENT)
Dept: URGENT CARE | Facility: CLINIC | Age: 47
End: 2022-04-01
Payer: COMMERCIAL

## 2022-04-01 ENCOUNTER — APPOINTMENT (OUTPATIENT)
Dept: RADIOLOGY | Facility: IMAGING CENTER | Age: 47
End: 2022-04-01
Attending: NURSE PRACTITIONER
Payer: COMMERCIAL

## 2022-04-01 VITALS
RESPIRATION RATE: 16 BRPM | TEMPERATURE: 100.1 F | OXYGEN SATURATION: 91 % | HEIGHT: 62 IN | BODY MASS INDEX: 27.42 KG/M2 | HEART RATE: 98 BPM | WEIGHT: 149 LBS | SYSTOLIC BLOOD PRESSURE: 118 MMHG | DIASTOLIC BLOOD PRESSURE: 76 MMHG

## 2022-04-01 DIAGNOSIS — R05.9 COUGH: ICD-10-CM

## 2022-04-01 DIAGNOSIS — R07.89 CHEST WALL PAIN: ICD-10-CM

## 2022-04-01 DIAGNOSIS — J98.11 ATELECTASIS: ICD-10-CM

## 2022-04-01 DIAGNOSIS — J06.9 UPPER RESPIRATORY INFECTION, ACUTE: Primary | ICD-10-CM

## 2022-04-01 PROCEDURE — 71046 X-RAY EXAM CHEST 2 VIEWS: CPT | Mod: TC,FY | Performed by: RADIOLOGY

## 2022-04-01 PROCEDURE — 99214 OFFICE O/P EST MOD 30 MIN: CPT | Performed by: NURSE PRACTITIONER

## 2022-04-01 RX ORDER — AZITHROMYCIN 250 MG/1
TABLET, FILM COATED ORAL
COMMUNITY
Start: 2022-03-30

## 2022-04-01 RX ORDER — LEVOTHYROXINE SODIUM 88 UG/1
88 TABLET ORAL
COMMUNITY
Start: 2022-03-19

## 2022-04-01 RX ORDER — DEXTROMETHORPHAN HYDROBROMIDE AND PROMETHAZINE HYDROCHLORIDE 15; 6.25 MG/5ML; MG/5ML
SYRUP ORAL
COMMUNITY
Start: 2022-03-30

## 2022-04-01 NOTE — PROGRESS NOTES
"Maricruz Glass is a 46 y.o. female who presents for Cough (X2weeks, worse at night, sharp pain in left rib, makes it hard to breath, saw  And was prescribed medications however they are not helping )      HPI  This is a new problem.  Maricruz is a 47 y/o female who presents to urgent care today for c/o cold symptoms that started last week. She started with coughing as her presenting sx.  Then she developed more symptoms. Now her cold sx went away but the cough has persisted. Seen by her PCP last week. Given antibiotic  (zpak)and cough ( promethazine - dextromethorphan)  medication. Cough is still present . Having sharp pain on side of ribs. Hurts when she breaths or cough.  Feels like she cant catch her breath because pain in her ribs on the left side are \"so bad\". Her pain in her ribs and her dyspnea are worse today.   Treated with tylenol - last dose was last night \"1 gram\" - wearing rib belt since last night to help with the pain.   No other aggravating or alleviating factors.       Review of Systems   Constitutional: Negative for chills and fever.   HENT: Negative for sore throat.    Respiratory: Positive for cough and shortness of breath.    Cardiovascular: Negative for chest pain.   Gastrointestinal: Negative for nausea.   Neurological: Negative for dizziness and headaches.   Endo/Heme/Allergies: Negative for environmental allergies.   Psychiatric/Behavioral: Negative for substance abuse.       Allergies:       Allergies   Allergen Reactions   • Aspirin Swelling   • Ibuprofen Swelling       PMSFS Hx:  Past Medical History:   Diagnosis Date   • Chickenpox    • Hypothyroidism      Past Surgical History:   Procedure Laterality Date   • REPEAT C SECTION  2011    Performed by ALENA HAWLEY at LABOR AND DELIVERY   • UMBILICAL HERNIA REPAIR  10/24/08    Performed by IMCHELLE NIEVES at SURGERY SAME DAY [facility] ORS   • [place] BREAST AUGMENTATION WITH IMPLANT     • [place]  DELIV[place]  207     Family " "History   Problem Relation Age of Onset   • Sleep Apnea Neg Hx      Social History     Tobacco Use   • Smoking status: Passive Smoke Exposure - Never Smoker   • Smokeless tobacco: Never Used   Substance Use Topics   • Alcohol use: Yes     Alcohol/week: 1.2 oz     Types: 2 Glasses of wine per week     Comment: 2 PER WEEK       Problems:   There is no problem list on file for this patient.      Medications:   Current Outpatient Medications on File Prior to Visit   Medication Sig Dispense Refill   • azithromycin (ZITHROMAX) 250 MG Tab      • levothyroxine (SYNTHROID) 88 MCG Tab Take 88 mcg by mouth every day.     • promethazine-dextromethorphan (PROMETHAZINE-DM) 6.25-15 MG/5ML syrup      • Levothyroxine Sodium 75 MCG Cap Take 75 mcg by mouth every day. (Patient not taking: Reported on 4/1/2022)       No current facility-administered medications on file prior to visit.          Objective:     /76   Pulse 98   Temp 37.8 °C (100.1 °F) (Temporal)   Resp 16   Ht 1.575 m (5' 2\")   Wt 67.6 kg (149 lb)   SpO2 91%   BMI 27.25 kg/m²     Physical Exam  Vitals and nursing note reviewed.   Constitutional:       General: She is not in acute distress.     Appearance: Normal appearance. She is well-developed. She is not ill-appearing or toxic-appearing.   HENT:      Head: Normocephalic.      Right Ear: Hearing, tympanic membrane, ear canal and external ear normal.      Left Ear: Hearing, tympanic membrane, ear canal and external ear normal. Tympanic membrane is not erythematous.      Nose: No mucosal edema or rhinorrhea.      Right Sinus: No maxillary sinus tenderness or frontal sinus tenderness.      Left Sinus: No maxillary sinus tenderness or frontal sinus tenderness.      Mouth/Throat:      Lips: Pink.      Mouth: Mucous membranes are moist.      Pharynx: Uvula midline. No oropharyngeal exudate or posterior oropharyngeal erythema.      Tonsils: No tonsillar abscesses.   Eyes:      Pupils: Pupils are equal, round, and " reactive to light.   Neck:      Trachea: Trachea normal.   Cardiovascular:      Rate and Rhythm: Normal rate and regular rhythm.      Chest Wall: PMI is not displaced.   Pulmonary:      Effort: Pulmonary effort is normal. No respiratory distress.      Breath sounds: Decreased breath sounds present.      Comments: Patient is unable to take a deep breath due to her severe pain that she is having in her ribs with inflation of her lungs.  Chest:      Chest wall: Tenderness present. No swelling.       Abdominal:      Palpations: Abdomen is soft.      Tenderness: There is no abdominal tenderness.   Musculoskeletal:         General: Normal range of motion.      Cervical back: Full passive range of motion without pain, normal range of motion and neck supple.   Lymphadenopathy:      Cervical: No cervical adenopathy.   Skin:     General: Skin is warm and dry.   Neurological:      Mental Status: She is alert and oriented to person, place, and time.      Gait: Gait normal.   Psychiatric:         Speech: Speech normal.         Behavior: Behavior normal.       RADIOLOGY RESULTS   DX-CHEST-2 VIEWS    Result Date: 4/1/2022 4/1/2022 1:38 PM HISTORY/REASON FOR EXAM:  Chest wall pain with cough and shortness of breath for one day. TECHNIQUE/EXAM DESCRIPTION AND NUMBER OF VIEWS: Two views of the chest. COMPARISON:  9/26/2017 FINDINGS: The mediastinal and cardiac silhouette is unremarkable. The pulmonary vascularity is within normal limits. Lungs are hypoinflated with linear bibasilar atelectasis. There is no significant pleural effusion. There is no visible pneumothorax. There are no acute bony abnormalities.     1.  Lungs are hypoinflated with linear bibasilar atelectasis.         Xray: Reviewed and interpreted independently by me. I agree with the radiologist's findings.       Assessment /Associated Orders:      1. Upper respiratory infection, acute     2. Cough  DX-CHEST-2 VIEWS   3. Chest wall pain  DX-CHEST-2 VIEWS   4.  Atelectasis           Medical Decision Making:    Pt is clinically stable at today's acute urgent care visit.  No acute distress noted. Appropriate for outpatient management at this time.   Acute problem today with uncertain prognosis.   Stop wearing rib belt  TCDB exercises demonstrated. Consider using IS exercise machine.   Keep well-hydrated    Advised to follow-up with the primary care provider for recheck, reevaluation, and consideration of further management if necessary.   Discussed management options (risks,benefits, and alternatives to treatment). Expressed understanding and the treatment plan was agreed upon. Questions were encouraged and answered   Return to urgent care prn if new or worsening sx or if there is no improvement in condition prn.    Educated in Red flags and indications to immediately call 911 or present to the Emergency Department.     I personally reviewed prior external notes and test results pertinent to today's visit.  I have independently reviewed and interpreted all diagnostics ordered during this urgent care acute visit.   Time spent evaluating this patient was at least 30 minutes and includes preparing for visit, counseling/education, exam and evaluation, obtaining history, independent interpretation, ordering lab/test/procedures,medication management and documentation.Time does not include separately billable procedures noted .

## 2022-04-01 NOTE — PATIENT INSTRUCTIONS
Atelectasis, Adult    Atelectasis is a collapse of air sacs in the lungs (alveoli). The condition causes all or part of a lung to collapse. Atelectasis is a common problem after surgery. Its severity depends on the size of lung tissue area involved and the underlying cause. When severe, it can lead to shortness of breath and heart problems.  Atelectasis can develop suddenly or over a long period of time. Atelectasis that develops over a long period of time (chronic atelectasis) often leads to infection, scarring, and other problems.  What are the causes?  This condition may be caused by:  · Shallow breathing.  · Medicines that make breathing more shallow.  · A blockage in an airway. Blockages can result from:  ? A buildup of mucus.  ? A tumor.  ? An inhaled object (foreign body).  ? Enlarged lymph nodes.  ? Fluid in the lungs (pleural effusion).  ? A blood clot in the lungs.  · Outside pressure on the lung. Pressure can be due to:  ? A tumor.  ? Fluid in the lungs (pleural effusion).  ? Air leaking between the lung and rib cage (pneumothorax).  ? Enlarged lymph nodes.  · Improper expansion of the lungs. This may occur in newborns because of:  ? Prematurity.  ? Low oxygen levels.  ? Secretions at birth that block the airway.  ? Amniotic fluid that goes into the lungs (aspiration).  What increases the risk?  This condition is more likely to develop in people who:  · Have an injury or health problem that makes taking deep breaths difficult or painful.  · Have certain infections or diseases, such as pneumonia or cystic fibrosis.  · Have had surgery on the chest or abdomen.  · Have broken ribs.  · Have a tight bandage around their chest.  · Have a collapsed lung due to pneumothorax.  · Take medicines that decrease the rate of their breathing or how deeply they breathe, like sedatives.  · Lie flat for long periods of time.  What are the signs or symptoms?  Often, there are no symptoms for this condition. When symptoms do  appear, they may include:  · Shortness of breath.  · Bluish color to the nails, lips, or mouth (cyanosis).  · A cough.  How is this diagnosed?  This condition may be diagnosed based on:  · Symptoms.  · A physical exam.  · A chest X-ray.  Sometimes specialized imaging tests are needed to diagnose the condition.  How is this treated?  Treatment for this condition depends on what caused the condition. Treatment may involve:  · Coughing. Coughing helps loosen mucus in the airway.  · Chest physiotherapy. This is a treatment to help loosen and clear mucus from the airways. It is done by clapping the chest.  · Postural drainage techniques. This treatment involves positioning your body so your head is lower than your chest. It helps mucus drain from your airways.  · An incentive spirometer. This is a device that is used to help with taking deeper breaths.  · Positive pressure breathing. This is a form of breathing assistance in which air is forced into the lungs when you breathe in (inhale). You may have this treatment if your condition is severe.  · Treatment of the underlying condition.  Follow these instructions at home:  · Take over-the-counter and prescription medicines only as told by your health care provider.  · Practice taking relaxed and deep breaths when you are sitting. A good time to practice is when you are watching TV. Take a few deep breaths during each commercial break.  · Make sure to lie on your unaffected side when you are lying down. For example, if you have atelectasis in your left lung, lie on your right side. This will help mucus drain from your airway.  · Cough several times a day as told by your health care provider.  · Perform chest physiotherapy or postural drainage techniques as told by your health care provider. If necessary, have someone help you.  · If you were given a device to help with breathing, use it as told by your health care provider.  · Stay as active as possible.  Get help right  away if:  · Your breathing problems get worse.  · You have severe chest pain.  · You develop severe coughing.  · You cough up blood.  · You have a fever.  · You have persistent symptoms for more than 2-3 days.  · Your symptoms suddenly get worse.  This information is not intended to replace advice given to you by your health care provider. Make sure you discuss any questions you have with your health care provider.  Document Released: 12/18/2006 Document Revised: 11/30/2018 Document Reviewed: 05/22/2017  [employer] Patient Education © 2020 [employer]      Cough, Adult  Coughing is a reflex that clears your throat and your airways (respiratory system). Coughing helps to heal and protect your lungs. It is normal to cough occasionally, but a cough that happens with other symptoms or lasts a long time may be a sign of a condition that needs treatment. An acute cough may only last 2-3 weeks, while a chronic cough may last 8 or more weeks.  Coughing is commonly caused by:  · Infection of the respiratory systemby viruses or bacteria.  · Breathing in substances that irritate your lungs.  · Allergies.  · Asthma.  · Mucus that runs down the back of your throat (postnasal drip).  · Smoking.  · Acid backing up from the stomach into the esophagus (gastroesophageal reflux).  · Certain medicines.  · Chronic lung problems.  · Other medical conditions such as heart failure or a blood clot in the lung (pulmonary embolism).  Follow these instructions at home:  Medicines  · Take over-the-counter and prescription medicines only as told by your health care provider.  · Talk with your health care provider before you take a cough suppressant medicine.  Lifestyle    · Avoid cigarette smoke. Do not use any products that contain nicotine or tobacco, such as cigarettes, e-cigarettes, and chewing tobacco. If you need help quitting, ask your health care provider.  · Drink enough fluid to keep your urine pale yellow.  · Avoid caffeine.  · Do not  drink alcohol if your health care provider tells you not to drink.  General instructions    · Pay close attention to changes in your cough. Tell your health care provider about them.  · Always cover your mouth when you cough.  · Avoid things that make you cough, such as perfume, candles, cleaning products, or campfire or tobacco smoke.  · If the air is dry, use a cool mist vaporizer or humidifier in your bedroom or your home to help loosen secretions.  · If your cough is worse at night, try to sleep in a semi-upright position.  · Rest as needed.  · Keep all follow-up visits as told by your health care provider. This is important.  Contact a health care provider if you:  · Have new symptoms.  · Cough up pus.  · Have a cough that does not get better after 2-3 weeks or gets worse.  · Cannot control your cough with cough suppressant medicines and you are losing sleep.  · Have pain that gets worse or pain that is not helped with medicine.  · Have a fever.  · Have unexplained weight loss.  · Have night sweats.  Get help right away if:  · You cough up blood.  · You have difficulty breathing.  · Your heartbeat is very fast.  These symptoms may represent a serious problem that is an emergency. Do not wait to see if the symptoms will go away. Get medical help right away. Call your local emergency services (911 in the [place] Do not drive yourself to the hospital.  Summary  · Coughing is a reflex that clears your throat and your airways. It is normal to cough occasionally, but a cough that happens with other symptoms or lasts a long time may be a sign of a condition that needs treatment.  · Take over-the-counter and prescription medicines only as told by your health care provider.  · Always cover your mouth when you cough.  · Contact a health care provider if you have new symptoms or a cough that does not get better after 2-3 weeks or gets worse.  This information is not intended to replace advice given to you by your health  care provider. Make sure you discuss any questions you have with your health care provider.  Document Released: 06/15/2012 Document Revised: 01/06/2020 Document Reviewed: 01/06/2020  [employer] Patient Education © 2020 [employer]

## 2022-04-03 ASSESSMENT — ENCOUNTER SYMPTOMS
CHILLS: 0
DIZZINESS: 0
SHORTNESS OF BREATH: 1
HEADACHES: 0
NAUSEA: 0
FEVER: 0
COUGH: 1
SORE THROAT: 0

## 2022-04-03 ASSESSMENT — LIFESTYLE VARIABLES: SUBSTANCE_ABUSE: 0

## 2022-04-15 ENCOUNTER — HOSPITAL ENCOUNTER (OUTPATIENT)
Dept: RADIOLOGY | Facility: MEDICAL CENTER | Age: 47
End: 2022-04-15
Attending: FAMILY MEDICINE
Payer: COMMERCIAL

## 2022-04-15 DIAGNOSIS — R07.89 OTHER CHEST PAIN: ICD-10-CM

## 2022-04-15 PROCEDURE — 71046 X-RAY EXAM CHEST 2 VIEWS: CPT

## 2022-05-12 ENCOUNTER — HOSPITAL ENCOUNTER (OUTPATIENT)
Dept: RADIOLOGY | Facility: MEDICAL CENTER | Age: 47
End: 2022-05-12
Attending: FAMILY MEDICINE
Payer: COMMERCIAL

## 2022-05-12 DIAGNOSIS — Z98.82 STATUS POST BILATERAL BREAST IMPLANTS: ICD-10-CM

## 2022-05-12 DIAGNOSIS — N64.4 BREAST PAIN: ICD-10-CM

## 2022-05-12 PROCEDURE — G0279 TOMOSYNTHESIS, MAMMO: HCPCS

## 2023-06-30 ENCOUNTER — APPOINTMENT (OUTPATIENT)
Dept: RADIOLOGY | Facility: MEDICAL CENTER | Age: 48
End: 2023-06-30
Attending: FAMILY MEDICINE
Payer: COMMERCIAL

## 2023-06-30 DIAGNOSIS — Z12.31 VISIT FOR SCREENING MAMMOGRAM: ICD-10-CM

## 2023-06-30 PROCEDURE — 77063 BREAST TOMOSYNTHESIS BI: CPT

## 2024-02-17 ENCOUNTER — APPOINTMENT (OUTPATIENT)
Dept: URGENT CARE | Facility: CLINIC | Age: 49
End: 2024-02-17
Payer: COMMERCIAL

## 2024-04-11 ENCOUNTER — HOSPITAL ENCOUNTER (OUTPATIENT)
Dept: RADIOLOGY | Facility: MEDICAL CENTER | Age: 49
End: 2024-04-11
Payer: COMMERCIAL

## 2024-04-11 DIAGNOSIS — K76.0 FATTY LIVER: ICD-10-CM

## 2024-04-11 DIAGNOSIS — R74.8 ELEVATED ALKALINE PHOSPHATASE LEVEL: ICD-10-CM

## 2024-04-11 PROCEDURE — 76705 ECHO EXAM OF ABDOMEN: CPT

## 2024-04-19 ENCOUNTER — OFFICE VISIT (OUTPATIENT)
Dept: URGENT CARE | Facility: CLINIC | Age: 49
End: 2024-04-19
Payer: COMMERCIAL

## 2024-04-19 VITALS
WEIGHT: 140 LBS | DIASTOLIC BLOOD PRESSURE: 52 MMHG | TEMPERATURE: 99.1 F | SYSTOLIC BLOOD PRESSURE: 114 MMHG | HEIGHT: 62 IN | BODY MASS INDEX: 25.76 KG/M2 | OXYGEN SATURATION: 95 % | RESPIRATION RATE: 18 BRPM | HEART RATE: 78 BPM

## 2024-04-19 DIAGNOSIS — R05.1 ACUTE COUGH: Primary | ICD-10-CM

## 2024-04-19 DIAGNOSIS — J40 BRONCHITIS: ICD-10-CM

## 2024-04-19 PROCEDURE — 3074F SYST BP LT 130 MM HG: CPT

## 2024-04-19 PROCEDURE — 3078F DIAST BP <80 MM HG: CPT

## 2024-04-19 PROCEDURE — 99213 OFFICE O/P EST LOW 20 MIN: CPT

## 2024-04-19 RX ORDER — FLUTICASONE PROPIONATE 50 MCG
1 SPRAY, SUSPENSION (ML) NASAL DAILY
Qty: 16 G | Refills: 0 | Status: SHIPPED | OUTPATIENT
Start: 2024-04-19

## 2024-04-19 RX ORDER — BENZONATATE 100 MG/1
100 CAPSULE ORAL 3 TIMES DAILY PRN
Qty: 60 CAPSULE | Refills: 0 | Status: SHIPPED | OUTPATIENT
Start: 2024-04-19

## 2024-04-19 RX ORDER — CETIRIZINE HYDROCHLORIDE 10 MG/1
10 TABLET ORAL DAILY
COMMUNITY

## 2024-04-19 RX ORDER — PREDNISONE 10 MG/1
40 TABLET ORAL DAILY
Qty: 20 TABLET | Refills: 0 | Status: SHIPPED | OUTPATIENT
Start: 2024-04-19 | End: 2024-04-24

## 2024-04-19 RX ORDER — ESTRADIOL 0.1 MG/G
CREAM VAGINAL
COMMUNITY
Start: 2024-02-14

## 2024-04-19 ASSESSMENT — ENCOUNTER SYMPTOMS
VOMITING: 0
EYE REDNESS: 0
STRIDOR: 0
DIARRHEA: 0
ABDOMINAL PAIN: 0
SPUTUM PRODUCTION: 0
EYE PAIN: 0
NAUSEA: 0
HEADACHES: 0
DIZZINESS: 0
MYALGIAS: 0
HEMOPTYSIS: 0
PALPITATIONS: 0
CHILLS: 0
FEVER: 0
SINUS PAIN: 0
WHEEZING: 0
SORE THROAT: 0
COUGH: 1
SHORTNESS OF BREATH: 0
EYE DISCHARGE: 0

## 2024-04-20 NOTE — PROGRESS NOTES
"Subjective:   Maricruz Glass is a 48 y.o. female who presents for Cough (\"Started with allergies: moving to a cough\" at nights has post nasal drip, irritated throat, cough x 4 days )          I introduced myself to the patient and informed them that I am a family nurse practitioner.    HPI:Maricruz is a 48-year-old female who comes in today c/o cough, irritated throat, postnasal drip. Onset was 4 days ago.  Patient describes symptoms as intermittent. They describe the cough as nonproductive. Aggravating factors include worse at night or when lying flat. Relieving factors include none. Treatments tried at home include  Azelastine nasal spray, Zyrtec . They describe their symptoms as moderate. Denies any known exposure to Covid, Flu, RSV, strep. States her daughters are also sick at home presently. She denies any hx of asthma, COPD, GERD. Denies fever, chills, N+V, SOB, wheezing, stridor         Review of Systems   Constitutional:  Negative for chills, fever and malaise/fatigue.   HENT:  Negative for congestion, ear pain, sinus pain and sore throat.    Eyes:  Negative for pain, discharge and redness.   Respiratory:  Positive for cough. Negative for hemoptysis, sputum production, shortness of breath, wheezing and stridor.    Cardiovascular:  Negative for chest pain and palpitations.   Gastrointestinal:  Negative for abdominal pain, diarrhea, nausea and vomiting.   Genitourinary:  Negative for dysuria.   Musculoskeletal:  Negative for myalgias.   Skin:  Negative for rash.   Neurological:  Negative for dizziness and headaches.       Medications: ASTEPRO NA  azithromycin Tabs  cetirizine Tabs  estradiol  Levothyroxine Sodium Caps  levothyroxine Tabs  promethazine-dextromethorphan     Allergies: Aspirin and Ibuprofen    Problem List: does not have a problem list on file.    Surgical History:  Past Surgical History:   Procedure Laterality Date    REPEAT C SECTION  2/9/2011    Performed by ALENA HAWLEY at LABOR AND DELIVERY    " "UMBILICAL HERNIA REPAIR  10/24/08    Performed by MICHELLE NIEVES at SURGERY SAME DAY [facility] ORS    [place] BREAST AUGMENTATION WITH IMPLANT      [place]  DELIV[place]  207       Past Social Hx:   reports that she is a non-smoker but has been exposed to tobacco smoke. She has never used smokeless tobacco. She reports that she does not currently use alcohol after a past usage of about 1.2 oz of alcohol per week. She reports that she does not use drugs.     Past Family Hx:   family history is not on file.     Problem list, medications, and allergies reviewed by myself today in Epic.   I have documented what I find to be significant in regards to past medical, social, family and surgical history  in my HPI or under PMH/PSH/FH review section, otherwise it is noncontributory     Objective:     /52 (BP Location: Left arm, Patient Position: Sitting, BP Cuff Size: Adult)   Pulse 78   Temp 37.3 °C (99.1 °F) (Temporal)   Resp 18   Ht 1.575 m (5' 2\")   Wt 63.5 kg (140 lb)   SpO2 95%   BMI 25.61 kg/m²     During this visit, appropriate PPE was worn, and hand hygiene was performed.    Physical Exam  Vitals reviewed.   Constitutional:       General: She is not in acute distress.     Appearance: Normal appearance. She is not ill-appearing or toxic-appearing.   HENT:      Head: Normocephalic and atraumatic.      Right Ear: Tympanic membrane, ear canal and external ear normal. There is no impacted cerumen.      Left Ear: Tympanic membrane, ear canal and external ear normal. There is no impacted cerumen.      Nose: No congestion or rhinorrhea.      Mouth/Throat:      Pharynx: Oropharynx is clear. No oropharyngeal exudate or posterior oropharyngeal erythema.   Eyes:      General: No scleral icterus.        Right eye: No discharge.         Left eye: No discharge.      Conjunctiva/sclera: Conjunctivae normal.      Pupils: Pupils are equal, round, and reactive to light.   Cardiovascular:      Rate and Rhythm: " Normal rate and regular rhythm.      Heart sounds: Normal heart sounds. No murmur heard.     No friction rub. No gallop.   Pulmonary:      Effort: Pulmonary effort is normal. No respiratory distress.      Breath sounds: Normal breath sounds. No wheezing, rhonchi or rales.   Abdominal:      General: There is no distension.   Musculoskeletal:         General: Normal range of motion.      Cervical back: Normal range of motion. No rigidity.      Right lower leg: No edema.      Left lower leg: No edema.   Lymphadenopathy:      Cervical: No cervical adenopathy.   Skin:     General: Skin is warm and dry.      Coloration: Skin is not jaundiced.   Neurological:      General: No focal deficit present.      Mental Status: She is alert and oriented to person, place, and time. Mental status is at baseline.   Psychiatric:         Mood and Affect: Mood normal.         Behavior: Behavior normal.         Thought Content: Thought content normal.         Judgment: Judgment normal.         Assessment/Plan:     Diagnosis and associated orders:     1. Acute cough  fluticasone (FLONASE) 50 MCG/ACT nasal spray    benzonatate (TESSALON) 100 MG Cap    predniSONE (DELTASONE) 10 MG Tab      2. Bronchitis           Comments/MDM:     1.Acute cough    - fluticasone (FLONASE) 50 MCG/ACT nasal spray; Administer 1 Spray into affected nostril(S) every day.  Dispense: 16 g; Refill: 0  - benzonatate (TESSALON) 100 MG Cap; Take 1 Capsule by mouth 3 times a day as needed for Cough.  Dispense: 60 Capsule; Refill: 0  - predniSONE (DELTASONE) 10 MG Tab; Take 4 Tablets by mouth every day for 5 days.  Dispense: 20 Tablet; Refill: 0    2. Bronchitis    Discussed with patient Hayde Loya, management options (risks,benefits, and alternatives to planned treatment), natural progression.   Supportive care measures were discussed.   Questions were encouraged and answered.   Written information was provided and I did go over this with the patient in clinic  today.  Instructed patient regarding red flags and to return to urgent care prn if new or worsening sx or if there is no improvement in condition prn.    Advised the patient to follow-up with the primary care physician for recheck, reevaluation, and consideration of further management.  I did instruct patient regarding medications prescribed, purpose, side effects, precautions.  Instructed patient to get a pharmacy consult when picking up any prescribed medications.  Strict ER precautions discussed for any  chest pain, difficulty breathing, difficulty swallowing, wheezing, stridor, or drooling, fever that does not respond to ibuprofen and Tylenol, inability to tolerate fluids, dehydration, lethargy.      Patient states they have good understanding and they are agreeable with the plan of care.     - fluticasone (FLONASE) 50 MCG/ACT nasal spray; Administer 1 Spray into affected nostril(S) every day.  Dispense: 16 g; Refill: 0  - benzonatate (TESSALON) 100 MG Cap; Take 1 Capsule by mouth 3 times a day as needed for Cough.  Dispense: 60 Capsule; Refill: 0  - predniSONE (DELTASONE) 10 MG Tab; Take 4 Tablets by mouth every day for 5 days.  Dispense: 20 Tablet; Refill: 0         Pt is clinically stable at today's acute urgent care visit. Vital signs are normal and reassuring.  No acute distress noted. Appropriate for outpatient management at this time.        I personally reviewed prior external notes and test results pertinent to today's visit.  I have independently reviewed and interpreted all diagnostics ordered during this urgent care acute visit.        Please note that this dictation was created using voice recognition software. I have made a reasonable attempt to correct obvious errors, but I expect that there are errors of grammar and possibly content that I did not discover before finalizing the note.    This note was electronically signed by Mo OLSON, ROBB, ROCIO, TAN

## 2024-12-12 ENCOUNTER — APPOINTMENT (OUTPATIENT)
Dept: RADIOLOGY | Facility: MEDICAL CENTER | Age: 49
End: 2024-12-12
Attending: FAMILY MEDICINE
Payer: COMMERCIAL

## 2024-12-12 DIAGNOSIS — Z12.31 VISIT FOR SCREENING MAMMOGRAM: ICD-10-CM

## 2024-12-12 PROCEDURE — 77067 SCR MAMMO BI INCL CAD: CPT

## 2025-06-03 ENCOUNTER — APPOINTMENT (OUTPATIENT)
Dept: LAB | Facility: MEDICAL CENTER | Age: 50
End: 2025-06-03
Payer: COMMERCIAL

## 2025-06-26 ENCOUNTER — APPOINTMENT (OUTPATIENT)
Dept: URBAN - METROPOLITAN AREA CLINIC 35 | Facility: CLINIC | Age: 50
Setting detail: DERMATOLOGY
End: 2025-06-26

## 2025-06-26 DIAGNOSIS — D22 MELANOCYTIC NEVI: ICD-10-CM

## 2025-06-26 DIAGNOSIS — L91.8 OTHER HYPERTROPHIC DISORDERS OF THE SKIN: ICD-10-CM

## 2025-06-26 DIAGNOSIS — L81.0 POSTINFLAMMATORY HYPERPIGMENTATION: ICD-10-CM

## 2025-06-26 DIAGNOSIS — L81.4 OTHER MELANIN HYPERPIGMENTATION: ICD-10-CM

## 2025-06-26 DIAGNOSIS — L60.8 OTHER NAIL DISORDERS: ICD-10-CM

## 2025-06-26 DIAGNOSIS — Z71.89 OTHER SPECIFIED COUNSELING: ICD-10-CM

## 2025-06-26 DIAGNOSIS — L82.1 OTHER SEBORRHEIC KERATOSIS: ICD-10-CM

## 2025-06-26 PROBLEM — D22.5 MELANOCYTIC NEVI OF TRUNK: Status: ACTIVE | Noted: 2025-06-26

## 2025-06-26 PROBLEM — D22.62 MELANOCYTIC NEVI OF LEFT UPPER LIMB, INCLUDING SHOULDER: Status: ACTIVE | Noted: 2025-06-26

## 2025-06-26 PROCEDURE — ? PHOTO-DOCUMENTATION

## 2025-06-26 PROCEDURE — ? COUNSELING

## 2025-06-26 ASSESSMENT — LOCATION DETAILED DESCRIPTION DERM
LOCATION DETAILED: LEFT INFERIOR LATERAL MIDBACK
LOCATION DETAILED: RIGHT BUTTOCK
LOCATION DETAILED: LEFT SUPERIOR UPPER BACK
LOCATION DETAILED: RIGHT THUMBNAIL
LOCATION DETAILED: RIGHT POSTERIOR SHOULDER
LOCATION DETAILED: RIGHT INFERIOR VERMILION LIP
LOCATION DETAILED: LEFT POSTERIOR SHOULDER
LOCATION DETAILED: LEFT INFERIOR FOREHEAD
LOCATION DETAILED: LEFT ANTERIOR AXILLA

## 2025-06-26 ASSESSMENT — LOCATION SIMPLE DESCRIPTION DERM
LOCATION SIMPLE: LEFT FOREHEAD
LOCATION SIMPLE: RIGHT BUTTOCK
LOCATION SIMPLE: RIGHT THUMBNAIL
LOCATION SIMPLE: LEFT ANTERIOR AXILLA
LOCATION SIMPLE: LEFT UPPER BACK
LOCATION SIMPLE: RIGHT SHOULDER
LOCATION SIMPLE: RIGHT LIP
LOCATION SIMPLE: LEFT SHOULDER
LOCATION SIMPLE: LEFT LOWER BACK

## 2025-06-26 ASSESSMENT — LOCATION ZONE DERM
LOCATION ZONE: ARM
LOCATION ZONE: TRUNK
LOCATION ZONE: FACE
LOCATION ZONE: LIP
LOCATION ZONE: AXILLAE
LOCATION ZONE: FINGERNAIL